# Patient Record
(demographics unavailable — no encounter records)

---

## 2024-10-21 NOTE — PHYSICAL EXAM
[Alert] : alert [Normocephalic] : normocephalic [Flat Open Anterior [place]] : flat open anterior fontanelle [PERRL] : PERRL [Red Reflex Bilateral] : red reflex bilateral [Normally Placed Ears] : normally placed ears [Auricles Well Formed] : auricles well formed [Clear Tympanic membranes] : clear tympanic membranes [Light reflex present] : light reflex present [Bony landmarks visible] : bony landmarks visible [Nares Patent] : nares patent [Palate Intact] : palate intact [Uvula Midline] : uvula midline [Supple, full passive range of motion] : supple, full passive range of motion [Symmetric Chest Rise] : symmetric chest rise [Clear to Auscultation Bilaterally] : clear to auscultation bilaterally [Regular Rate and Rhythm] : regular rate and rhythm [S1, S2 present] : S1, S2 present [+2 Femoral Pulses] : +2 femoral pulses [Soft] : soft [Bowel Sounds] : bowel sounds present [Normal external genitailia] : normal external genitalia [Patent Vagina] : vagina patent [Normally Placed] : normally placed [No Abnormal Lymph Nodes Palpated] : no abnormal lymph nodes palpated [Symmetric Flexed Extremities] : symmetric flexed extremities [Startle Reflex] : startle reflex present [Suck Reflex] : suck reflex present [Rooting] : rooting reflex present [Palmar Grasp] : palmar grasp reflex present [Plantar Grasp] : plantar grasp reflex present [Symmetric Jack] : symmetric [place] [Acute Distress] : no acute distress [Discharge] : no discharge [Palpable Masses] : no palpable masses [Murmurs] : no murmurs [Tender] : nontender [Distended] : not distended [Hepatomegaly] : no hepatomegaly [Splenomegaly] : no splenomegaly [Clitoromegaly] : no clitoromegaly [Masterson-Ortolani] : negative Masterson-Ortolani [Spinal Dimple] : no spinal dimple [Tuft of Hair] : no tuft of hair [Jaundice] : no jaundice [Rash and/or lesion present] : no rash/lesion [de-identified] :  white patches on tongue and inner cheek   [de-identified] : erythematous, maculopapular eruption on intertriginous region, mons pubis, perineum and buttocks with satellite lesions

## 2024-10-21 NOTE — DISCUSSION/SUMMARY
[Normal Growth] : growth [Normal Development] : development  [No Elimination Concerns] : elimination [Continue Regimen] : feeding [No Skin Concerns] : skin [Normal Sleep Pattern] : sleep [None] : no medical problems [Anticipatory Guidance Given] : Anticipatory guidance addressed as per the history of present illness section [Parental Well-Being] : parental well-being [Family Adjustment] : family adjustment [Feeding Routines] : feeding routines [Infant Adjustment] : infant adjustment [Safety] : safety [Age Approp Vaccines] : Age appropriate vaccines administered [No Medications] : ~He/She~ is not on any medications [Parent/Guardian] : Parent/Guardian [FreeTextEntry1] : 1 month girl  here for well check. No concerns with growth and development Resolving Thrush- continue with Nystatin Solution Diaper rash-  Apply nystatin Cream to affected area BID. Side effect of topical antifungal includes but not limited to worsening erythema. Recommend zinc oxide with every diaper change. Change diaper frequently and allow for periods of no diaper wearing to promote dryness. If no improvement return to office.   Recommend exclusive breastfeeding, 8-12 feedings per day. Mother should continue prenatal vitamins and avoid alcohol. If formula is needed, recommend iron-fortified formulations, 2-4 oz every 2-3 hrs. When in car, patient should be in rear-facing car seat in back seat. Put baby to sleep on back, in own crib with no loose or soft bedding. Help baby to develop sleep and feeding routines. May offer pacifier if needed. Start tummy time when awake. Limit baby's exposure to others, especially those with fever or unknown vaccine status. Parents counseled to call if rectal temperature >100.4 degrees F.   All questions addressed.   Return at 2 mos old for well check visit

## 2024-10-21 NOTE — DEVELOPMENTAL MILESTONES
[Passed] : passed [Normal Development] : Normal Development [None] : none [Calms when picked up or spoken to] : calms when picked up or spoken to [Looks briefly at objects] : looks briefly at objects [Alerts to unexpected sound] : alerts to unexpected sound [Makes brief short vowel sounds] : makes brief short vowel sounds [Holds fingers more open at rest] : holds fingers more open at rest

## 2024-10-21 NOTE — HISTORY OF PRESENT ILLNESS
[Parents] : parents [Normal] : Normal [In Bassinet/Crib] : sleeps in bassinet/crib [On back] : sleeps on back [No] : No cigarette smoke exposure [Water heater temperature set at <120 degrees F] : Water heater temperature set at <120 degrees F [Rear facing car seat in back seat] : Rear facing car seat in back seat [Carbon Monoxide Detectors] : Carbon monoxide detectors at home [Smoke Detectors] : Smoke detectors at home. [Breast milk] : breast milk [Formula ___ oz in 24hrs] : [unfilled] oz of formula in 24 hours [Hours between feeds ___] : Child is fed every [unfilled] hours [___ voids per day] : [unfilled] voids per day [Frequency of stools: ___] : Frequency of stools: [unfilled]  stools [per day] : per day. [NO] : No [Loose bedding, pillow, toys, and/or bumpers in crib] : no loose bedding, pillow, toys, and/or bumpers in crib [Pacifier use] : not using pacifier [Exposure to electronic nicotine delivery system] : No exposure to electronic nicotine delivery system [At risk for exposure to TB] : Not at risk for exposure to Tuberculosis  [FreeTextEntry7] : thrush, lacrimal duct stenosis [de-identified] : diaper rash

## 2024-11-01 NOTE — HISTORY OF PRESENT ILLNESS
[de-identified] : tongue tie  [FreeTextEntry6] : Parents concerned about tongue thrust, tongue-tie, because of choking with feedings it happens once each feeding although does not that last very long it seems intense and is upsetting to see.  Then he actually resumes feeding rather well overall his feedings are completed within about 15 minutes he is getting pumped milk and formula and is not on vitamin D They have tried different bottles that has led to some benefits but has not resolved the choking with feedings

## 2024-11-01 NOTE — DISCUSSION/SUMMARY
[FreeTextEntry1] : First we can try an antiacid and if that does not help a feeding therapist and if the feeding therapist then believes he should see an ENT to look for notch we will escalate to that level.  Mom understands and agree the process of navigating through this.  I do not think the mild posterior tongue-tie is the cause of this and we will not refer to an oral surgeon to have this lasered.  Stool tested today negative for blood no signs or symptoms which would implicate milk intolerance normal bowel movements

## 2024-11-27 NOTE — PHYSICAL EXAM
[Alert] : alert [Acute Distress] : no acute distress [Normocephalic] : normocephalic [Flat Open Anterior [place]] : flat open anterior fontanelle [PERRL] : PERRL [Red Reflex Bilateral] : red reflex bilateral [Normally Placed Ears] : normally placed ears [Auricles Well Formed] : auricles well formed [Clear Tympanic membranes] : clear tympanic membranes [Light reflex present] : light reflex present [Bony landmarks visible] : bony landmarks visible [Discharge] : no discharge [Nares Patent] : nares patent [Palate Intact] : palate intact [Uvula Midline] : uvula midline [Supple, full passive range of motion] : supple, full passive range of motion [Palpable Masses] : no palpable masses [Symmetric Chest Rise] : symmetric chest rise [Clear to Auscultation Bilaterally] : clear to auscultation bilaterally [Regular Rate and Rhythm] : regular rate and rhythm [S1, S2 present] : S1, S2 present [Murmurs] : no murmurs [+2 Femoral Pulses] : +2 femoral pulses [Soft] : soft [Tender] : nontender [Distended] : not distended [Bowel Sounds] : bowel sounds present [Hepatomegaly] : no hepatomegaly [Splenomegaly] : no splenomegaly [Normal external genitailia] : normal external genitalia [Central Urethral Opening] : central urethral opening [Testicles Descended Bilaterally] : testicles descended bilaterally [Normally Placed] : normally placed [No Abnormal Lymph Nodes Palpated] : no abnormal lymph nodes palpated [Masterson-Ortolani] : negative Masterson-Ortolani [Symmetric Flexed Extremities] : symmetric flexed extremities [Spinal Dimple] : no spinal dimple [Tuft of Hair] : no tuft of hair [Startle Reflex] : startle reflex present [Suck Reflex] : suck reflex present [Rooting] : rooting reflex present [Palmar Grasp] : palmar grasp reflex present [Plantar Grasp] : plantar grasp reflex present [Symmetric Jack] : symmetric [place] [Rash and/or lesion present] : no rash/lesion

## 2024-11-27 NOTE — PHYSICAL EXAM
[Alert] : alert [Acute Distress] : no acute distress [Normocephalic] : normocephalic [Flat Open Anterior [place]] : flat open anterior fontanelle [PERRL] : PERRL [Red Reflex Bilateral] : red reflex bilateral [Normally Placed Ears] : normally placed ears [Auricles Well Formed] : auricles well formed [Clear Tympanic membranes] : clear tympanic membranes [Light reflex present] : light reflex present [Bony landmarks visible] : bony landmarks visible [Discharge] : no discharge [Nares Patent] : nares patent [Palate Intact] : palate intact [Uvula Midline] : uvula midline [Supple, full passive range of motion] : supple, full passive range of motion [Palpable Masses] : no palpable masses [Symmetric Chest Rise] : symmetric chest rise [Clear to Auscultation Bilaterally] : clear to auscultation bilaterally [Regular Rate and Rhythm] : regular rate and rhythm [S1, S2 present] : S1, S2 present [Murmurs] : no murmurs [+2 Femoral Pulses] : +2 femoral pulses [Soft] : soft [Tender] : nontender [Distended] : not distended [Bowel Sounds] : bowel sounds present [Hepatomegaly] : no hepatomegaly [Splenomegaly] : no splenomegaly [Normal external genitailia] : normal external genitalia [Central Urethral Opening] : central urethral opening [Testicles Descended Bilaterally] : testicles descended bilaterally [Normally Placed] : normally placed [No Abnormal Lymph Nodes Palpated] : no abnormal lymph nodes palpated [Masterson-Ortolani] : negative Masterson-Ortolani [Symmetric Flexed Extremities] : symmetric flexed extremities [Spinal Dimple] : no spinal dimple [Tuft of Hair] : no tuft of hair [Startle Reflex] : startle reflex present [Suck Reflex] : suck reflex present [Rooting] : rooting reflex present [Palmar Grasp] : palmar grasp reflex present [Plantar Grasp] : plantar grasp reflex present [Symmetric Jack] : symmetric [place] [Rash and/or lesion present] : no rash/lesion

## 2024-11-27 NOTE — DISCUSSION/SUMMARY
[Normal Growth] : growth [Normal Development] : development  [No Elimination Concerns] : elimination [Continue Regimen] : feeding [No Skin Concerns] : skin [Normal Sleep Pattern] : sleep [None] : no medical problems [Anticipatory Guidance Given] : Anticipatory guidance addressed as per the history of present illness section [Age Approp Vaccines] : Age appropriate vaccines administered [No Medications] : ~He/She~ is not on any medications [Parent/Guardian] : Parent/Guardian [] : The components of the vaccine(s) to be administered today are listed in the plan of care. The disease(s) for which the vaccine(s) are intended to prevent and the risks have been discussed with the caretaker.  The risks are also included in the appropriate vaccination information statements which have been provided to the patient's caregiver.  The caregiver has given consent to vaccinate. [FreeTextEntry1] : [place] review and referral as appropriate.   Vitamin D for breast feeding infants  Tummy time when awake.  Put baby to sleep on back, in own crib with no loose or soft bedding. Help baby to develop sleep and feeding routines.  If formula is needed, recommend iron-fortified formulations, 2-4 oz every 2-3 hrs.  When in car, patient should be in rear-facing car seat in back seat.  Pacifier benefits reviewed  [email or web address] reviewed

## 2024-11-27 NOTE — DISCUSSION/SUMMARY
[Normal Growth] : growth [Normal Development] : development  [No Elimination Concerns] : elimination [Continue Regimen] : feeding [No Skin Concerns] : skin [Normal Sleep Pattern] : sleep [None] : no medical problems [Anticipatory Guidance Given] : Anticipatory guidance addressed as per the history of present illness section [Age Approp Vaccines] : Age appropriate vaccines administered [No Medications] : ~He/She~ is not on any medications [Parent/Guardian] : Parent/Guardian [] : The components of the vaccine(s) to be administered today are listed in the plan of care. The disease(s) for which the vaccine(s) are intended to prevent and the risks have been discussed with the caretaker.  The risks are also included in the appropriate vaccination information statements which have been provided to the patient's caregiver.  The caregiver has given consent to vaccinate. [FreeTextEntry1] : [place] review and referral as appropriate.   Vitamin D for breast feeding infants  Tummy time when awake.  Put baby to sleep on back, in own crib with no loose or soft bedding. Help baby to develop sleep and feeding routines.  If formula is needed, recommend iron-fortified formulations, 2-4 oz every 2-3 hrs.  When in car, patient should be in rear-facing car seat in back seat.  Pacifier benefits reviewed  [email or web address] reviewed

## 2025-02-13 NOTE — PHYSICAL EXAM
[Alert] : alert [Acute Distress] : no acute distress [Flat Open Anterior [place]] : flat open anterior fontanelle [Red Reflex] : red reflex bilateral [PERRL] : PERRL [Normally Placed Ears] : normally placed ears [Auricles Well Formed] : auricles well formed [Clear Tympanic membranes] : clear tympanic membranes [Light reflex present] : light reflex present [Bony landmarks visible] : bony landmarks visible [Discharge] : no discharge [Nares Patent] : nares patent [Palate Intact] : palate intact [Uvula Midline] : uvula midline [Palpable Masses] : no palpable masses [Symmetric Chest Rise] : symmetric chest rise [Clear to Auscultation Bilaterally] : clear to auscultation bilaterally [Regular Rate and Rhythm] : regular rate and rhythm [S1, S2 present] : S1, S2 present [Murmurs] : no murmurs [+2 Femoral Pulses] : (+) 2 femoral pulses [Soft] : soft [Tender] : nontender [Distended] : nondistended [Bowel Sounds] : bowel sounds present [Hepatomegaly] : no hepatomegaly [Splenomegaly] : no splenomegaly [Normal External Genitalia] : normal external genitalia [Circumcised] : circumcised [Central Urethral Opening] : central urethral opening [Testicles Descended] : testicles descended bilaterally [Patent] : patent [Normally Placed] : normally placed [No Abnormal Lymph Nodes Palpated] : no abnormal lymph nodes palpated [Masterson-Ortolani] : negative Masterson-Ortolani [Allis Sign] : negative Allis sign [Spinal Dimple] : no spinal dimple [Tuft of Hair] : no tuft of hair [Startle Reflex] : startle reflex present [Plantar Grasp] : plantar grasp reflex present [Symmetric Jack] : symmetric jack [FreeTextEntry2] : cradle cap  [de-identified] : Beefy red inguinal folds

## 2025-02-13 NOTE — HISTORY OF PRESENT ILLNESS
[Mother] : mother [Formula ___ oz/feed] : [unfilled] oz of formula per feed [In Bassinet/Crib] : sleeps in bassinet/crib [On back] : sleeps on back [Co-sleeping] : no co-sleeping [Loose bedding, pillow, toys, and/or bumpers in crib] : no loose bedding, pillow, toys, and/or bumpers in crib [Tummy time] : tummy time [No] : No cigarette smoke exposure [Exposure to electronic nicotine delivery system] : No exposure to electronic nicotine delivery system [Water heater temperature set at <120 degrees F] : Water heater temperature set at <120 degrees F [Rear facing car seat in back seat] : Rear facing car seat in back seat [Carbon Monoxide Detectors] : Carbon monoxide detectors at home [Smoke Detectors] : Smoke detectors at home. [de-identified] : having 2 blow out diapers/day and spit up increased in 2 weeks. Using regular formula. No blood or mucus.  [de-identified] : Regular formula  [NO] : No

## 2025-02-13 NOTE — DISCUSSION/SUMMARY
[Normal Growth] : growth [Normal Development] : development  [No Elimination Concerns] : elimination [Continue Regimen] : feeding [Normal Sleep Pattern] : sleep [None] : no medical problems [Anticipatory Guidance Given] : Anticipatory guidance addressed as per the history of present illness section [Family Functioning] : family functioning [Nutritional Adequacy and Growth] : nutritional adequacy and growth [Infant Development] : infant development [Oral Health] : oral health [Safety] : safety [Age Approp Vaccines] : Age appropriate vaccines administered [Parent/Guardian] : Parent/Guardian [Mother] : mother [Parental Concerns Addressed] : Parental concerns addressed [] : The components of the vaccine(s) to be administered today are listed in the plan of care. The disease(s) for which the vaccine(s) are intended to prevent and the risks have been discussed with the caretaker.  The risks are also included in the appropriate vaccination information statements which have been provided to the patient's caregiver.  The caregiver has given consent to vaccinate. [FreeTextEntry1] : Discussed loose stools and spitting up with decreased acceleration in weight gain.  GI PCR ordered.  OK to start solids. RTO in 3 weeks for weight check.  Recommend breastfeeding, 8-12 feedings per day. If breastfeeding, mother should continue prenatal vitamins and avoid alcohol. If formula is needed, recommend iron-fortified formulations.  Cereal and or other solids may be introduced using a spoon and bowl when developmentally appropriate. When in car, patient should be in rear-facing car seat in back seat. Put baby to sleep on back, in own crib with no loose or soft bedding. Lower crib mattress. Ensure safe environment if baby is able to roll. Help baby to maintain sleep and feeding routines Continue tummy time when awake prior to each feeding.  Discussed sleep training. Avoid direct sun, use protective clothing with SPF protection when in the sun.

## 2025-02-21 NOTE — PHYSICAL EXAM
[NL] : warm, clear [FreeTextEntry3] :  Bilateral impacted cerumen removed with curette.R dull L clear

## 2025-02-21 NOTE — DISCUSSION/SUMMARY
[FreeTextEntry1] : I think it is OK to sleep train based on the findings  Mom agrees with plan Expectant care. Follow up as needed for fever trend, new, or worsening symptoms. Has FU March 6 can recheck ears then or sooner as needed

## 2025-02-26 NOTE — HISTORY OF PRESENT ILLNESS
[de-identified] : cough, congestion [FreeTextEntry6] : CESAR BAEZ is a 4 month old male presenting for complaints of cough, ongoing diarrhea without blood or mucus, some spititng up and congestion. Spit up is NBNB  No fevers.

## 2025-02-26 NOTE — HISTORY OF PRESENT ILLNESS
[de-identified] : cough, congestion [FreeTextEntry6] : CESAR BAEZ is a 4 month old male presenting for complaints of cough, ongoing diarrhea without blood or mucus, some spititng up and congestion. Spit up is NBNB  No fevers.

## 2025-02-26 NOTE — PHYSICAL EXAM
[Clear] : left tympanic membrane clear [Erythema] : erythema [Bulging] : bulging [Clear Rhinorrhea] : clear rhinorrhea [NL] : warm, clear [No Acute Distress] : no acute distress

## 2025-02-26 NOTE — HISTORY OF PRESENT ILLNESS
[de-identified] : cough, congestion [FreeTextEntry6] : CESAR BAEZ is a 4 month old male presenting for complaints of cough, ongoing diarrhea without blood or mucus, some spititng up and congestion. Spit up is NBNB  No fevers.

## 2025-02-26 NOTE — DISCUSSION/SUMMARY
Raquel Franklin discharged to home . Patient provided with the following educational materials upon discharge:  Pneumonia discharge instructions, prednisone, duoneb, amoxicillin and warfarin.   Valuables and belongings sent with patient.   discharge summary, discharge instructions, medications and follow up appointments reviewed with patient. Patient  verbalized understanding.     Patient transported via  by  downstairs. Significant other waiting in car.     [FreeTextEntry1] : URI diarrhea  GI PCR  R AOM  Patient has been diagnosed with acute otitis media.  If prescribed, complete course of antibiotics.  Supportive measures including Tylenol and Ibuprofen can be used as needed for pain or fever. If patient fails to improve within the next 1-3 days parent/patient understands to follow up.. Supportive measures for upper respiratory infection were discussed. Such measures include use of nasal saline and suction as needed to clear the nasal passages, increasing fluids, hot showers or steam from the bathroom, propping the child up on a second pillow (for children > 1 year old), use of an OTC home remedy such as vapo rub for comfort and giving 1 tablespoon of honey an hour before bedtime for cough. (Honey is only to be given for children 1 year of age and older) Tylenol can be used every 4 hours as needed for fever or pain and Motrin can be used every 6 hours as needed for fever or pain.  If child has a fever of 100.4 or more or symptoms are worsening at any time, return for recheck or seek other medical attention..

## 2025-02-26 NOTE — DISCUSSION/SUMMARY
[FreeTextEntry1] : URI diarrhea  GI PCR  R AOM  Patient has been diagnosed with acute otitis media.  If prescribed, complete course of antibiotics.  Supportive measures including Tylenol and Ibuprofen can be used as needed for pain or fever. If patient fails to improve within the next 1-3 days parent/patient understands to follow up.. Supportive measures for upper respiratory infection were discussed. Such measures include use of nasal saline and suction as needed to clear the nasal passages, increasing fluids, hot showers or steam from the bathroom, propping the child up on a second pillow (for children > 1 year old), use of an OTC home remedy such as vapo rub for comfort and giving 1 tablespoon of honey an hour before bedtime for cough. (Honey is only to be given for children 1 year of age and older) Tylenol can be used every 4 hours as needed for fever or pain and Motrin can be used every 6 hours as needed for fever or pain.  If child has a fever of 100.4 or more or symptoms are worsening at any time, return for recheck or seek other medical attention..

## 2025-03-11 NOTE — PHYSICAL EXAM
[Clear] : right tympanic membrane clear [Erythema] : no erythema [Bulging] : not bulging [Purulent Effusion] : no purulent effusion [NL] : warm, clear

## 2025-03-11 NOTE — HISTORY OF PRESENT ILLNESS
[de-identified] : ear infection, weight recheck  [FreeTextEntry6] : Recently diagnosed with ear infection over the weekend. Was taken to urgent care because older toddler had flu B. Baby was shaking his head, coughing and raspy. Now taking Cefdinir for right AOM.   Has been feeding formula about 5oz 6 times per day, 30oz/day total. Also started purees this week.

## 2025-03-11 NOTE — DISCUSSION/SUMMARY
[FreeTextEntry1] : Baby gained about 14g/day since last visit, however has had two intercurrent infections since last WCC. Per history, intake sounds appropriate for age. Advised to include extra calories in solid foods with butter, cheese, avocados etc. Can increase a bottle or two during the day a few ounces of formula. Provided reassurance, will see again for 6m WCC.  Advised to do Cefdinir for 5 day course then stop, as ears appear to be cleared from infection. Return precautions given.

## 2025-03-11 NOTE — HISTORY OF PRESENT ILLNESS
[de-identified] : ear infection, weight recheck  [FreeTextEntry6] : Recently diagnosed with ear infection over the weekend. Was taken to urgent care because older toddler had flu B. Baby was shaking his head, coughing and raspy. Now taking Cefdinir for right AOM.   Has been feeding formula about 5oz 6 times per day, 30oz/day total. Also started purees this week.

## 2025-05-01 NOTE — PHYSICAL EXAM
[Alert] : alert [Normocephalic] : normocephalic [Flat Open Anterior [place]] : flat open anterior fontanelle [Red Reflex] : red reflex bilateral [PERRL] : PERRL [Normally Placed Ears] : normally placed ears [Auricles Well Formed] : auricles well formed [Clear Tympanic membranes] : clear tympanic membranes [Light reflex present] : light reflex present [Bony landmarks visible] : bony landmarks visible [Nares Patent] : nares patent [Palate Intact] : palate intact [Uvula Midline] : uvula midline [Supple, full passive range of motion] : supple, full passive range of motion [Symmetric Chest Rise] : symmetric chest rise [Clear to Auscultation Bilaterally] : clear to auscultation bilaterally [Regular Rate and Rhythm] : regular rate and rhythm [S1, S2 present] : S1, S2 present [+2 Femoral Pulses] : (+) 2 femoral pulses [Soft] : soft [Bowel Sounds] : bowel sounds present [Normal External Genitalia] : normal external genitalia [Circumcised] : circumcised [Central Urethral Opening] : central urethral opening [Testicles Descended] : testicles descended bilaterally [Patent] : patent [Normally Placed] : normally placed [No Abnormal Lymph Nodes Palpated] : no abnormal lymph nodes palpated [Symmetric Buttocks Creases] : symmetric buttocks creases [Plantar Grasp] : plantar grasp reflex present [Cranial Nerves Grossly Intact] : cranial nerves grossly intact [Rash or Lesions] : rash and/or lesion present [Acute Distress] : no acute distress [Discharge] : no discharge [Tooth Eruption] : no tooth eruption [Palpable Masses] : no palpable masses [Murmurs] : no murmurs [Tender] : nontender [Distended] : nondistended [Hepatomegaly] : no hepatomegaly [Splenomegaly] : no splenomegaly [Masterson-Ortolani] : negative Masterson-Ortolani [Allis Sign] : negative Allis sign [Spinal Dimple] : no spinal dimple [Tuft of Hair] : no tuft of hair [de-identified] : maculopapular eruption to lower back

## 2025-05-01 NOTE — DISCUSSION/SUMMARY
[Normal Growth] : growth [Normal Development] : development [None] : No medical problems [No Elimination Concerns] : elimination [No Feeding Concerns] : feeding [No Skin Concerns] : skin [Normal Sleep Pattern] : sleep [Family Functioning] : family functioning [Nutrition and Feeding] : nutrition and feeding [Infant Development] : infant development [Oral Health] : oral health [Safety] : safety [No Medications] : ~He/She~ is not on any medications [Parent/Guardian] : parent/guardian [Mother] : mother [Parental Concerns Addressed] : Parental concerns addressed [] : The components of the vaccine(s) to be administered today are listed in the plan of care. The disease(s) for which the vaccine(s) are intended to prevent and the risks have been discussed with the caretaker.  The risks are also included in the appropriate vaccination information statements which have been provided to the patient's caregiver.  The caregiver has given consent to vaccinate. [de-identified] : pediatric ophthalmology  [FreeTextEntry1] : Referral to pediatric ophthalmology for failed vision screen.  Declined Rota today.   Recommend breastfeeding, 8-12 feedings per day. If formula is needed, use iron fortified infant formula. Introduce single-ingredient foods rich in iron, one at a time. Fruits and vegetables after iron fortified cereal or pureed meats. Give 1-2 TBS of solid food 2-3 times/day.  Incorporate up to 4 oz of water daily in a sippy cup. No juice. When teeth erupt wipe daily with washcloth. When in car, patient should be in rear-facing car seat in back seat. Put baby to sleep on back, in own crib with no loose or soft bedding. Lower crib mattress. Help baby to maintain sleep and feeding routines. . Continue tummy time when awake. Ensure home is safe since baby is now more mobile. Do not use infant walker. Read aloud to baby. Use of SPF 30 or more with reapplication and tick checks every 12 hours when playing outside. OK to use bug spray. Poison control discussed. Water safety discussed. Use of a [facility] approved life jacket with designated water watcher.   Return in 3 months for 9 month well visit.

## 2025-05-01 NOTE — HISTORY OF PRESENT ILLNESS
[Mother] : mother [Well-balanced] : well-balanced [Normal] : Normal [In Bassinet/Crib] : sleeps in bassinet/crib [On back] : sleeps on back [Tummy time] : tummy time [No] : No cigarette smoke exposure [Water heater temperature set at <120 degrees F] : Water heater temperature set at <120 degrees F [Rear facing car seat in back seat] : Rear facing car seat in back seat [Carbon Monoxide Detectors] : Carbon monoxide detectors at home [Smoke Detectors] : Smoke detectors at home. [NO] : No

## 2025-05-01 NOTE — DISCUSSION/SUMMARY
[Normal Growth] : growth [Normal Development] : development [None] : No medical problems [No Elimination Concerns] : elimination [No Feeding Concerns] : feeding [No Skin Concerns] : skin [Normal Sleep Pattern] : sleep [Family Functioning] : family functioning [Nutrition and Feeding] : nutrition and feeding [Infant Development] : infant development [Oral Health] : oral health [Safety] : safety [No Medications] : ~He/She~ is not on any medications [Parent/Guardian] : parent/guardian [Mother] : mother [Parental Concerns Addressed] : Parental concerns addressed [] : The components of the vaccine(s) to be administered today are listed in the plan of care. The disease(s) for which the vaccine(s) are intended to prevent and the risks have been discussed with the caretaker.  The risks are also included in the appropriate vaccination information statements which have been provided to the patient's caregiver.  The caregiver has given consent to vaccinate. [de-identified] : pediatric ophthalmology  [FreeTextEntry1] : Referral to pediatric ophthalmology for failed vision screen.  Declined Rota today.   Recommend breastfeeding, 8-12 feedings per day. If formula is needed, use iron fortified infant formula. Introduce single-ingredient foods rich in iron, one at a time. Fruits and vegetables after iron fortified cereal or pureed meats. Give 1-2 TBS of solid food 2-3 times/day.  Incorporate up to 4 oz of water daily in a sippy cup. No juice. When teeth erupt wipe daily with washcloth. When in car, patient should be in rear-facing car seat in back seat. Put baby to sleep on back, in own crib with no loose or soft bedding. Lower crib mattress. Help baby to maintain sleep and feeding routines. . Continue tummy time when awake. Ensure home is safe since baby is now more mobile. Do not use infant walker. Read aloud to baby. Use of SPF 30 or more with reapplication and tick checks every 12 hours when playing outside. OK to use bug spray. Poison control discussed. Water safety discussed. Use of a [facility] approved life jacket with designated water watcher.   Return in 3 months for 9 month well visit.

## 2025-05-01 NOTE — PHYSICAL EXAM
[Alert] : alert [Normocephalic] : normocephalic [Flat Open Anterior [place]] : flat open anterior fontanelle [Red Reflex] : red reflex bilateral [PERRL] : PERRL [Normally Placed Ears] : normally placed ears [Auricles Well Formed] : auricles well formed [Clear Tympanic membranes] : clear tympanic membranes [Light reflex present] : light reflex present [Bony landmarks visible] : bony landmarks visible [Nares Patent] : nares patent [Palate Intact] : palate intact [Uvula Midline] : uvula midline [Supple, full passive range of motion] : supple, full passive range of motion [Symmetric Chest Rise] : symmetric chest rise [Clear to Auscultation Bilaterally] : clear to auscultation bilaterally [Regular Rate and Rhythm] : regular rate and rhythm [S1, S2 present] : S1, S2 present [+2 Femoral Pulses] : (+) 2 femoral pulses [Soft] : soft [Bowel Sounds] : bowel sounds present [Normal External Genitalia] : normal external genitalia [Circumcised] : circumcised [Central Urethral Opening] : central urethral opening [Testicles Descended] : testicles descended bilaterally [Patent] : patent [Normally Placed] : normally placed [No Abnormal Lymph Nodes Palpated] : no abnormal lymph nodes palpated [Symmetric Buttocks Creases] : symmetric buttocks creases [Plantar Grasp] : plantar grasp reflex present [Cranial Nerves Grossly Intact] : cranial nerves grossly intact [Rash or Lesions] : rash and/or lesion present [Acute Distress] : no acute distress [Discharge] : no discharge [Tooth Eruption] : no tooth eruption [Palpable Masses] : no palpable masses [Murmurs] : no murmurs [Tender] : nontender [Distended] : nondistended [Hepatomegaly] : no hepatomegaly [Splenomegaly] : no splenomegaly [Masterson-Ortolani] : negative Masterson-Ortolani [Allis Sign] : negative Allis sign [Spinal Dimple] : no spinal dimple [Tuft of Hair] : no tuft of hair [de-identified] : maculopapular eruption to lower back

## 2025-06-19 NOTE — PHYSICAL EXAM
[NL] : warm, clear [FreeTextEntry3] :  Bilateral impacted cerumen removed with curette. R clear L sull

## 2025-06-19 NOTE — HISTORY OF PRESENT ILLNESS
[de-identified] : Poss ear infection, cough and congestion  [FreeTextEntry6] : There has been no fever, but a few days runny nose and cough, although not been severely disruptive to sleep or activities.  No irritability or lethargy.  There has been only mild decrease in oral intake, there are minimal GI symptoms and no signs of dehydration.

## 2025-07-21 NOTE — HISTORY OF PRESENT ILLNESS
[Mother] : mother [Fruit] : fruit [Vegetables] : vegetables [Cereal] : cereal [Meat] : meat [Eggs] : eggs [Fish] : fish [Peanut] : peanut [Dairy] : dairy [Formula ___ oz/feed] : [unfilled] oz of formula per feed [Normal] : Normal [In Crib] : sleeps in crib [Brushing teeth] : brushing teeth [Water heater temperature set at <120 degrees F] : Water heater temperature set at <120 degrees F [Rear facing car seat in  back seat] : Rear facing car seat in  back seat [Carbon Monoxide Detectors] : Carbon monoxide detectors [Smoke Detectors] : Smoke detectors [Up to date] : Up to date [de-identified] : Concern for growing spot on the scalp.

## 2025-07-21 NOTE — HISTORY OF PRESENT ILLNESS
[Mother] : mother [Fruit] : fruit [Vegetables] : vegetables [Cereal] : cereal [Meat] : meat [Eggs] : eggs [Fish] : fish [Peanut] : peanut [Dairy] : dairy [Formula ___ oz/feed] : [unfilled] oz of formula per feed [Normal] : Normal [In Crib] : sleeps in crib [Brushing teeth] : brushing teeth [Water heater temperature set at <120 degrees F] : Water heater temperature set at <120 degrees F [Rear facing car seat in  back seat] : Rear facing car seat in  back seat [Carbon Monoxide Detectors] : Carbon monoxide detectors [Smoke Detectors] : Smoke detectors [Up to date] : Up to date [de-identified] : Concern for growing spot on the scalp.

## 2025-07-21 NOTE — DISCUSSION/SUMMARY
[FreeTextEntry1] : Baby presenting for 9m [facility], gaining weight and developing beautifully. Continue breast milk or formula as desired. Increase table foods, 3 meals with 2-3 snacks per day. Incorporate up to 6 oz of fluorinated water daily in a sippy cup. Discussed weaning of bottle and pacifier. Wipe teeth daily with washcloth. When in car, patient should be in rear-facing car seat in back seat. Put baby to sleep in own crib with no loose or soft bedding. Lower crib mattress. Help baby to maintain consistent daily routines and sleep schedule. Recognize stranger anxiety. Ensure home is safe since baby is increasingly mobile. Be within arm's reach of baby at all times. Use consistent, positive discipline. Avoid screen time. Read aloud to baby.  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 Will refer to Dermatology given increasing size for further evaluation.  UTD on vaccines. Unable to do hearing today. No parental concern for hearing difficulties. Will check again at next visit. Will see again in 3 months for 12m C.

## 2025-07-21 NOTE — PHYSICAL EXAM
[Alert] : alert [Normocephalic] : normocephalic [Flat Open Anterior [place]] : flat open anterior fontanelle [Red Reflex] : red reflex bilateral [PERRL] : PERRL [Normally Placed Ears] : normally placed ears [Auricles Well Formed] : auricles well formed [Clear Tympanic membranes] : clear tympanic membranes [Light reflex present] : light reflex present [Bony landmarks visible] : bony landmarks visible [Nares Patent] : nares patent [Palate Intact] : palate intact [Uvula Midline] : uvula midline [Tooth Eruption] : tooth eruption [Supple, full passive range of motion] : supple, full passive range of motion [Symmetric Chest Rise] : symmetric chest rise [Clear to Auscultation Bilaterally] : clear to auscultation bilaterally [Regular Rate and Rhythm] : regular rate and rhythm [S1, S2 present] : S1, S2 present [+2 Femoral Pulses] : (+) 2 femoral pulses [Soft] : soft [Bowel Sounds] : bowel sounds present [Central Urethral Opening] : central urethral opening [Testicles Descended] : testicles descended bilaterally [No Abnormal Lymph Nodes Palpated] : no abnormal lymph nodes palpated [Symmetric Abduction and Rotation of hips] : symmetric abduction and rotation of hips [Straight] : straight [Cranial Nerves Grossly Intact] : cranial nerves grossly intact [Acute Distress] : no acute distress [Excessive Tearing] : no excessive tearing [Discharge] : no discharge [Palpable Masses] : no palpable masses [Murmurs] : no murmurs [Tender] : nontender [Distended] : nondistended [Hepatomegaly] : no hepatomegaly [Splenomegaly] : no splenomegaly [Allis Sign] : negative Allis sign [Rash or Lesions] : no rash/lesions [de-identified] : ~1cm vascular plaque on left parietal scalp, no hair growth

## 2025-07-21 NOTE — PHYSICAL EXAM
[Alert] : alert [Normocephalic] : normocephalic [Flat Open Anterior [place]] : flat open anterior fontanelle [Red Reflex] : red reflex bilateral [PERRL] : PERRL [Normally Placed Ears] : normally placed ears [Auricles Well Formed] : auricles well formed [Clear Tympanic membranes] : clear tympanic membranes [Light reflex present] : light reflex present [Bony landmarks visible] : bony landmarks visible [Nares Patent] : nares patent [Palate Intact] : palate intact [Uvula Midline] : uvula midline [Tooth Eruption] : tooth eruption [Supple, full passive range of motion] : supple, full passive range of motion [Symmetric Chest Rise] : symmetric chest rise [Clear to Auscultation Bilaterally] : clear to auscultation bilaterally [Regular Rate and Rhythm] : regular rate and rhythm [S1, S2 present] : S1, S2 present [+2 Femoral Pulses] : (+) 2 femoral pulses [Soft] : soft [Bowel Sounds] : bowel sounds present [Central Urethral Opening] : central urethral opening [Testicles Descended] : testicles descended bilaterally [No Abnormal Lymph Nodes Palpated] : no abnormal lymph nodes palpated [Symmetric Abduction and Rotation of hips] : symmetric abduction and rotation of hips [Straight] : straight [Cranial Nerves Grossly Intact] : cranial nerves grossly intact [Acute Distress] : no acute distress [Excessive Tearing] : no excessive tearing [Discharge] : no discharge [Palpable Masses] : no palpable masses [Murmurs] : no murmurs [Tender] : nontender [Distended] : nondistended [Hepatomegaly] : no hepatomegaly [Splenomegaly] : no splenomegaly [Allis Sign] : negative Allis sign [Rash or Lesions] : no rash/lesions [de-identified] : ~1cm vascular plaque on left parietal scalp, no hair growth